# Patient Record
Sex: FEMALE | Race: WHITE | Employment: FULL TIME | URBAN - METROPOLITAN AREA
[De-identification: names, ages, dates, MRNs, and addresses within clinical notes are randomized per-mention and may not be internally consistent; named-entity substitution may affect disease eponyms.]

---

## 2024-02-06 NOTE — PROGRESS NOTES
[facility]  [address] [place] [address]  Phone: [phone]  Fax: [phone]      Date of Visit:  24  Patient Name: Katie Vincent   Patient :  2000     HPI:     Katie Vincent is a 23 y.o. female with   There is no problem list on file for this patient.    who presents today to discuss   Chief Complaint   Patient presents with    New Patient     Patient has neck pain which leads to her migraine. Patient does not have a migraine today, but had one yesterday. Patient states Excedrin everyday. KP       Recent labwork:  Hospital Outpatient Visit on 2024   Component Date Value Ref Range Status    Hemoglobin A1C 2024 4.9  4.0 - 6.0 % Final    Estimated Avg Glucose 2024 94  mg/dL Final    Comment: The ADA and AACC recommend providing the estimated average glucose result to permit better   patient understanding of their HBA1c result.      TSH 2024 1.58  0.27 - 4.20 uIU/mL Final    Sodium 2024 139  136 - 145 mmol/L Final    Potassium 2024 4.6  3.7 - 5.3 mmol/L Final    Chloride 2024 106  98 - 107 mmol/L Final    CO2 2024 24  20 - 31 mmol/L Final    Anion Gap 2024 9  9 - 16 mmol/L Final    Glucose 2024 91  74 - 99 mg/dL Final    BUN 2024 9  6 - 20 mg/dL Final    Creatinine 2024 0.8  0.50 - 0.90 mg/dL Final    Est, Glom Filt Rate 2024 >60  >60 mL/min/1.73m2 Final    Comment:       These results are not intended for use in patients <18 years of age.        eGFR results are calculated without a race factor using the  CKD-EPI equation.  Careful clinical correlation is recommended, particularly when comparing to results   calculated using previous equations.  The CKD-EPI equation is less accurate in patients with extremes of muscle mass, extra-renal   metabolism of creatine, excessive creatine ingestion, or following therapy that affects   renal

## 2024-02-07 ENCOUNTER — OFFICE VISIT (OUTPATIENT)
Age: 24
End: 2024-02-07
Payer: COMMERCIAL

## 2024-02-07 ENCOUNTER — HOSPITAL ENCOUNTER (OUTPATIENT)
Age: 24
Setting detail: SPECIMEN
Discharge: HOME | End: 2024-02-07

## 2024-02-07 VITALS
HEIGHT: 61 IN | TEMPERATURE: 98 F | WEIGHT: 154.6 LBS | HEART RATE: 84 BPM | SYSTOLIC BLOOD PRESSURE: 123 MMHG | RESPIRATION RATE: 16 BRPM | BODY MASS INDEX: 29.19 KG/M2 | DIASTOLIC BLOOD PRESSURE: 79 MMHG

## 2024-02-07 DIAGNOSIS — R03.0 ELEVATED BP WITHOUT DIAGNOSIS OF HYPERTENSION: ICD-10-CM

## 2024-02-07 DIAGNOSIS — Z86.32 HISTORY OF GESTATIONAL DIABETES: ICD-10-CM

## 2024-02-07 DIAGNOSIS — J30.89 NON-SEASONAL ALLERGIC RHINITIS, UNSPECIFIED TRIGGER: ICD-10-CM

## 2024-02-07 DIAGNOSIS — G44.209 ACUTE NON INTRACTABLE TENSION-TYPE HEADACHE: Primary | ICD-10-CM

## 2024-02-07 DIAGNOSIS — G44.209 ACUTE NON INTRACTABLE TENSION-TYPE HEADACHE: ICD-10-CM

## 2024-02-07 LAB
ALBUMIN SERPL-MCNC: 4.5 G/DL (ref 3.5–5.2)
ALBUMIN/GLOB SERPL: 2 {RATIO} (ref 1–2.5)
ALP SERPL-CCNC: 75 U/L (ref 35–104)
ALT SERPL-CCNC: 12 U/L (ref 10–35)
ANION GAP SERPL CALCULATED.3IONS-SCNC: 9 MMOL/L (ref 9–16)
AST SERPL-CCNC: 18 U/L (ref 10–35)
BASOPHILS # BLD: 0.04 K/UL (ref 0–0.2)
BASOPHILS NFR BLD: 1 % (ref 0–2)
BILIRUB SERPL-MCNC: 0.2 MG/DL (ref 0–1.2)
BUN SERPL-MCNC: 9 MG/DL (ref 6–20)
CALCIUM SERPL-MCNC: 9.1 MG/DL (ref 8.6–10.4)
CHLORIDE SERPL-SCNC: 106 MMOL/L (ref 98–107)
CO2 SERPL-SCNC: 24 MMOL/L (ref 20–31)
CREAT SERPL-MCNC: 0.8 MG/DL (ref 0.5–0.9)
EOSINOPHIL # BLD: 0.09 K/UL (ref 0–0.44)
EOSINOPHILS RELATIVE PERCENT: 2 % (ref 1–4)
ERYTHROCYTE [DISTWIDTH] IN BLOOD BY AUTOMATED COUNT: 13.3 % (ref 11.8–14.4)
EST. AVERAGE GLUCOSE BLD GHB EST-MCNC: 94 MG/DL
GFR SERPL CREATININE-BSD FRML MDRD: >60 ML/MIN/1.73M2
GLUCOSE SERPL-MCNC: 91 MG/DL (ref 74–99)
HBA1C MFR BLD: 4.9 % (ref 4–6)
HCT VFR BLD AUTO: 40.4 % (ref 36.3–47.1)
HGB BLD-MCNC: 13.4 G/DL (ref 11.9–15.1)
IMM GRANULOCYTES # BLD AUTO: <0.03 K/UL (ref 0–0.3)
IMM GRANULOCYTES NFR BLD: 0 %
LYMPHOCYTES NFR BLD: 1.31 K/UL (ref 1.1–3.7)
LYMPHOCYTES RELATIVE PERCENT: 27 % (ref 24–43)
MCH RBC QN AUTO: 29.3 PG (ref 25.2–33.5)
MCHC RBC AUTO-ENTMCNC: 33.2 G/DL (ref 28.4–34.8)
MCV RBC AUTO: 88.2 FL (ref 82.6–102.9)
MONOCYTES NFR BLD: 0.37 K/UL (ref 0.1–1.2)
MONOCYTES NFR BLD: 8 % (ref 3–12)
NEUTROPHILS NFR BLD: 62 % (ref 36–65)
NEUTS SEG NFR BLD: 3.01 K/UL (ref 1.5–8.1)
NRBC BLD-RTO: 0 PER 100 WBC
PLATELET # BLD AUTO: 196 K/UL (ref 138–453)
PMV BLD AUTO: 12.5 FL (ref 8.1–13.5)
POTASSIUM SERPL-SCNC: 4.6 MMOL/L (ref 3.7–5.3)
PROT SERPL-MCNC: 7 G/DL (ref 6.6–8.7)
RBC # BLD AUTO: 4.58 M/UL (ref 3.95–5.11)
SODIUM SERPL-SCNC: 139 MMOL/L (ref 136–145)
TSH SERPL DL<=0.05 MIU/L-ACNC: 1.58 UIU/ML (ref 0.27–4.2)
WBC OTHER # BLD: 4.8 K/UL (ref 3.5–11.3)

## 2024-02-07 PROCEDURE — G2211 COMPLEX E/M VISIT ADD ON: HCPCS | Performed by: FAMILY MEDICINE

## 2024-02-07 PROCEDURE — 99212 OFFICE O/P EST SF 10 MIN: CPT | Performed by: FAMILY MEDICINE

## 2024-02-07 PROCEDURE — 99204 OFFICE O/P NEW MOD 45 MIN: CPT | Performed by: FAMILY MEDICINE

## 2024-02-07 RX ORDER — METOPROLOL SUCCINATE 25 MG/1
25 TABLET, EXTENDED RELEASE ORAL DAILY
Qty: 90 TABLET | Refills: 1 | Status: SHIPPED | OUTPATIENT
Start: 2024-02-07

## 2024-02-07 RX ORDER — FLUTICASONE PROPIONATE 50 MCG
2 SPRAY, SUSPENSION (ML) NASAL DAILY
Qty: 16 G | Refills: 0 | Status: SHIPPED | OUTPATIENT
Start: 2024-02-07

## 2024-02-07 RX ORDER — CYCLOBENZAPRINE HCL 5 MG
5 TABLET ORAL NIGHTLY PRN
Qty: 30 TABLET | Refills: 1 | Status: SHIPPED | OUTPATIENT
Start: 2024-02-07

## 2024-02-07 RX ORDER — FEXOFENADINE HCL 180 MG/1
180 TABLET ORAL DAILY
Qty: 30 TABLET | Refills: 0 | Status: SHIPPED | OUTPATIENT
Start: 2024-02-07 | End: 2024-03-08

## 2024-02-07 NOTE — PATIENT INSTRUCTIONS
There are number of different medications we can use for your headaches.  I am when to try to tailor it so that you only use medication for rescue.  I think that the frequency your headaches are enough with daily that starting on something prophylactic would be a good idea.  I am going to suggest that we put you on the Tapazole which is a beta-blocker that she take once a day may also help with your blood pressure.    I am also going to place you on Allegra 180 mg once a day for your allergies in addition to Flonase which is a steroid nasal spray I think if we can open up your nasal passages and reduce the inflammation in your face that may help your headaches.  Would also like to test there is some blood work looking for anemia and iron deficiency.  In addition baseline liver and kidney function as part of the workup for your hypertension.    Increased attention to your hands and neck are as well as sleep position may also be helpful.  Believe it or not a short flat pillow has been found to be the best in left your side sleeper than actually the kidney bean shape is about the best.    I want you to start a diary of when you get your headache the severity of the headache and what you think may be attributed.  Bring that with you at your next visit in about 6 weeks.  It will take about 2 to 3 weeks to see some of the benefit for what we are putting you on.  I would also like you to start monitoring her blood pressure because I think some of this may be related to blood pressure.    On days that you are have the most tension in head neck I want to use the Flexeril 5 mg at bedtime.

## 2024-05-09 ENCOUNTER — OFFICE VISIT (OUTPATIENT)
Age: 24
End: 2024-05-09
Payer: COMMERCIAL

## 2024-05-09 VITALS
BODY MASS INDEX: 29.4 KG/M2 | DIASTOLIC BLOOD PRESSURE: 69 MMHG | HEIGHT: 62 IN | TEMPERATURE: 97.9 F | SYSTOLIC BLOOD PRESSURE: 122 MMHG | HEART RATE: 64 BPM | WEIGHT: 159.8 LBS | RESPIRATION RATE: 16 BRPM

## 2024-05-09 DIAGNOSIS — F42.2 MIXED OBSESSIONAL THOUGHTS AND ACTS: Primary | ICD-10-CM

## 2024-05-09 DIAGNOSIS — J30.89 NON-SEASONAL ALLERGIC RHINITIS, UNSPECIFIED TRIGGER: ICD-10-CM

## 2024-05-09 DIAGNOSIS — J30.1 NON-SEASONAL ALLERGIC RHINITIS DUE TO POLLEN: ICD-10-CM

## 2024-05-09 DIAGNOSIS — G44.229 CHRONIC TENSION-TYPE HEADACHE, NOT INTRACTABLE: ICD-10-CM

## 2024-05-09 DIAGNOSIS — R03.0 ELEVATED BP WITHOUT DIAGNOSIS OF HYPERTENSION: ICD-10-CM

## 2024-05-09 PROCEDURE — 99214 OFFICE O/P EST MOD 30 MIN: CPT | Performed by: FAMILY MEDICINE

## 2024-05-09 PROCEDURE — 99211 OFF/OP EST MAY X REQ PHY/QHP: CPT

## 2024-05-09 RX ORDER — ESCITALOPRAM OXALATE 10 MG/1
10 TABLET ORAL DAILY
Qty: 30 TABLET | Refills: 5 | Status: SHIPPED | OUTPATIENT
Start: 2024-05-09

## 2024-05-09 RX ORDER — LORATADINE 10 MG/1
10 TABLET ORAL DAILY
Qty: 30 TABLET | Refills: 0 | COMMUNITY
Start: 2024-05-09 | End: 2024-06-08

## 2024-05-09 ASSESSMENT — ENCOUNTER SYMPTOMS
EYE ITCHING: 0
SHORTNESS OF BREATH: 0
COUGH: 0
CONSTIPATION: 0
APNEA: 0
DIARRHEA: 0
EYE REDNESS: 0
ABDOMINAL PAIN: 0
SORE THROAT: 0
CHEST TIGHTNESS: 0
RHINORRHEA: 1
PHOTOPHOBIA: 0

## 2024-05-09 NOTE — ASSESSMENT & PLAN NOTE
Stressed importance of cognitive behavioral therapy and reaching out with her counselor.  Have also started her back on the Lexapro 10 mg once a day.  She might want to start with half a tablet for the first week or so and then increase to full tablet.

## 2024-05-09 NOTE — PROGRESS NOTES
[facility]  [address] [place] [address]  Phone: [phone]  Fax: [phone]      Date of Visit:  24   Patient Name: Katie Vincent   Patient :  2000     ASSESSMENT/PLAN     1. Mixed obsessional thoughts and acts  Assessment & Plan:  Stressed importance of cognitive behavioral therapy and reaching out with her counselor.  Have also started her back on the Lexapro 10 mg once a day.  She might want to start with half a tablet for the first week or so and then increase to full tablet.    Orders:  -     escitalopram (LEXAPRO) 10 MG tablet; Take 1 tablet by mouth daily, Disp-30 tablet, R-5Normal  2. Non-seasonal allergic rhinitis, unspecified trigger  Assessment & Plan:  Suggest getting on the Claritin and taking it along with the Flonase until she can breathe through both nostrils without being heard from across the room.  She can back off the Flonase but continue the Claritin because I believe she has seasonal allergies.    3. Elevated BP without diagnosis of hypertension  Assessment & Plan:  Will continue to follow.  For now suggest resuming the metoprolol that she had been on.  Believe by reducing her generalized process we may be improving blood pressure.    4. Non-seasonal allergic rhinitis due to pollen  Assessment & Plan:  Suggest getting on the Claritin and taking it along with the Flonase until she can breathe through both nostrils without being heard from across the room.  She can back off the Flonase but continue the Claritin because I believe she has seasonal allergies.    5. Chronic tension-type headache, not intractable  Assessment & Plan:  Importance of addressing her allergies which add to her tension type headache in terms of inflammation in addition to addressing what is causing the tension i.e. uncontrolled untreated OCD.  Would like her to stop the Excedrin Migraine.  She does not need the additional

## 2024-05-09 NOTE — ASSESSMENT & PLAN NOTE
Will continue to follow.  For now suggest resuming the metoprolol that she had been on.  Believe by reducing her generalized process we may be improving blood pressure.

## 2024-05-09 NOTE — ASSESSMENT & PLAN NOTE
Importance of addressing her allergies which add to her tension type headache in terms of inflammation in addition to addressing what is causing the tension i.e. uncontrolled untreated OCD.  Would like her to stop the Excedrin Migraine.  She does not need the additional caffeine which might be adding to her sleep issues.  It also results in rebound type headaches.  Naprosyn would be a better choice.  If the Naprosyn does not work then she can take the Fioricet which does have a little caffeine as well as acetaminophen in it.  It also has a muscle relaxant.  Very important she record her headaches and bring her headache diary in with her.  At her next visit

## 2024-05-09 NOTE — ASSESSMENT & PLAN NOTE
Suggest getting on the Claritin and taking it along with the Flonase until she can breathe through both nostrils without being heard from across the room.  She can back off the Flonase but continue the Claritin because I believe she has seasonal allergies.

## 2024-05-09 NOTE — PATIENT INSTRUCTIONS
Will start you back on the Lexapro 10 mg once a day.  Try to take steps to make sure that you when you open up the top of the pill bottle you can identify an account for any spill that might happen and that should help lessen your concern of unintended ingestion of the Lexapro by her son.    Lexapro is extremely safe and we use it in pregnancy even if he took several it probably would not hurt him.    Need to get back on the allergy medication Claritin 10 mg once a day is exceptionally safe and is cheap the Flonase the worst thing you can do is causing nosebleed in which case stop if we are seeing that much of the shiners I know that that is setting you up for another headache.    The metoprolol will not lower your blood pressure too far it will however markedly reduce the chances of your tension headache going back into a full migraine.    Your blood work overall was excellent!  Your A1c was 4.7.  Your elevated blood sugars in the morning fasting may be attributable to underlying glucose intolerance and/or the stress that you face on a daily basis.    I want you to reconnect with your counselor.  Cognitive behavioral therapy or coaching is the single most important thing for obsessive-compulsive disorder.  It will affect most aspects of your life.  Places tremendous stress on people.

## 2024-05-10 DIAGNOSIS — R51.9 NONINTRACTABLE HEADACHE, UNSPECIFIED CHRONICITY PATTERN, UNSPECIFIED HEADACHE TYPE: ICD-10-CM

## 2024-05-10 RX ORDER — BUTALBITAL, ACETAMINOPHEN AND CAFFEINE 50; 325; 40 MG/1; MG/1; MG/1
1 TABLET ORAL EVERY 4 HOURS PRN
Qty: 30 TABLET | Refills: 1 | Status: SHIPPED | OUTPATIENT
Start: 2024-05-10

## 2024-07-25 ENCOUNTER — OFFICE VISIT (OUTPATIENT)
Age: 24
End: 2024-07-25
Payer: COMMERCIAL

## 2024-07-25 VITALS
WEIGHT: 158 LBS | SYSTOLIC BLOOD PRESSURE: 112 MMHG | RESPIRATION RATE: 14 BRPM | TEMPERATURE: 98.7 F | DIASTOLIC BLOOD PRESSURE: 66 MMHG | HEIGHT: 62 IN | BODY MASS INDEX: 29.08 KG/M2 | HEART RATE: 77 BPM

## 2024-07-25 DIAGNOSIS — G44.209 TENSION HEADACHE: ICD-10-CM

## 2024-07-25 DIAGNOSIS — F42.2 MIXED OBSESSIONAL THOUGHTS AND ACTS: ICD-10-CM

## 2024-07-25 DIAGNOSIS — G43.109 MIGRAINE WITH AURA AND WITHOUT STATUS MIGRAINOSUS, NOT INTRACTABLE: Primary | ICD-10-CM

## 2024-07-25 DIAGNOSIS — J30.89 NON-SEASONAL ALLERGIC RHINITIS, UNSPECIFIED TRIGGER: ICD-10-CM

## 2024-07-25 PROCEDURE — 99214 OFFICE O/P EST MOD 30 MIN: CPT | Performed by: FAMILY MEDICINE

## 2024-07-25 PROCEDURE — 99212 OFFICE O/P EST SF 10 MIN: CPT | Performed by: FAMILY MEDICINE

## 2024-07-25 RX ORDER — FLUTICASONE PROPIONATE 50 MCG
2 SPRAY, SUSPENSION (ML) NASAL DAILY
Qty: 16 G | Refills: 0 | Status: SHIPPED | OUTPATIENT
Start: 2024-07-25

## 2024-07-25 RX ORDER — METOPROLOL SUCCINATE 25 MG/1
25 TABLET, EXTENDED RELEASE ORAL DAILY
COMMUNITY

## 2024-07-25 RX ORDER — ESCITALOPRAM OXALATE 20 MG/1
20 TABLET ORAL DAILY
Qty: 90 TABLET | Refills: 1 | Status: SHIPPED | OUTPATIENT
Start: 2024-07-25

## 2024-07-25 NOTE — PATIENT INSTRUCTIONS
For the symptoms of OCD you are on a very starting dose of Lexapro and I would go up from 10 to 20 mg/day which is the more of the working dose of Lexapro.  If we were just addressing migraines or depression I would suggest switching from Lexapro to Wellbutrin but Lexapro does not have an indication for OCD.    You meet the criteria barely for the use of the Nurtec for prevention or suppression of migraine 75 mg tablet every other day will write for 15 with 2 refills.  If it proves to be too expensive then I would consider the use of baclofen 10 to 20 mg at bedtime just because of the goodly portion of your headaches are related to muscle tension across the back of the head.    The headaches that across the forehead I typically sound either is frontal sinus or tension and that is where the baclofen actually would work better.  You also want to consider if we are doing all we can do with regards to your nasal allergies and nasal obstruction.  Then a nonsedating antihistamine in addition to the nasal steroid and/or regular use of a nasal saline to help flush those allergens off the surface before they trigger the release of the histamine is also indicated histamine is also related to the triggering migraines.

## 2024-07-25 NOTE — PROGRESS NOTES
[facility]  [address] [place] [address]  Phone: [phone]  Fax: [phone]      Date of Visit:  24  Patient Name: Katie Vincent   Patient :  2000     ASSESSMENT/PLAN     1. Intractable migraine with aura without status migrainosus  2. Non-seasonal allergic rhinitis, unspecified trigger  -     fluticasone (FLONASE) 50 MCG/ACT nasal spray; 2 sprays by Each Nostril route daily, Disp-16 g, R-0Normal  3. Mixed obsessional thoughts and acts  -     escitalopram (LEXAPRO) 20 MG tablet; Take 1 tablet by mouth daily, Disp-90 tablet, R-1Normal  4. Tension headache     For the symptoms of OCD you are on a very starting dose of Lexapro and I would go up from 10 to 20 mg/day which is the more of the working dose of Lexapro.  If we were just addressing migraines or depression I would suggest switching from Lexapro to Wellbutrin but Lexapro does not have an indication for OCD.    You meet the criteria barely for the use of the Nurtec for prevention or suppression of migraine 75 mg tablet every other day will write for 15 with 2 refills.  If it proves to be too expensive then I would consider the use of baclofen 10 to 20 mg at bedtime just because of the goodly portion of your headaches are related to muscle tension across the back of the head.      Return in about 3 months (around 10/25/2024) for Chronic Disease FU headache migraine intense.    - Questions/concerns answered. Patient verbalized and expressed understanding. Medications, laboratory testing, imaging, consultation, and follow up as documented in this encounter.       Please be aware portions of this note were completed using voice recognition software and unforeseen errors may have occurred    I personally reviewed the patient's past medical history, current medications, allergies, surgical history, family history and social history.  Updates were made as

## 2024-07-29 PROBLEM — G43.109 MIGRAINE WITH AURA AND WITHOUT STATUS MIGRAINOSUS, NOT INTRACTABLE: Status: ACTIVE | Noted: 2024-07-29

## 2024-07-29 ASSESSMENT — ENCOUNTER SYMPTOMS
SHORTNESS OF BREATH: 0
CONSTIPATION: 0
APNEA: 0
EYE REDNESS: 0
DIARRHEA: 0
EYE ITCHING: 0
CHEST TIGHTNESS: 0
ABDOMINAL PAIN: 0
SORE THROAT: 0
RHINORRHEA: 0
COUGH: 0
PHOTOPHOBIA: 0

## 2024-12-05 ENCOUNTER — PATIENT MESSAGE (OUTPATIENT)
Age: 24
End: 2024-12-05

## 2024-12-05 DIAGNOSIS — J30.89 NON-SEASONAL ALLERGIC RHINITIS, UNSPECIFIED TRIGGER: ICD-10-CM

## 2024-12-06 RX ORDER — METOPROLOL SUCCINATE 25 MG/1
25 TABLET, EXTENDED RELEASE ORAL DAILY
Qty: 30 TABLET | Refills: 5 | Status: SHIPPED | OUTPATIENT
Start: 2024-12-06

## 2024-12-06 RX ORDER — CYCLOBENZAPRINE HCL 5 MG
5 TABLET ORAL 3 TIMES DAILY PRN
Qty: 60 TABLET | Refills: 1 | Status: SHIPPED | OUTPATIENT
Start: 2024-12-06

## 2024-12-06 RX ORDER — FLUTICASONE PROPIONATE 50 MCG
2 SPRAY, SUSPENSION (ML) NASAL DAILY
Qty: 16 G | Refills: 0 | Status: SHIPPED | OUTPATIENT
Start: 2024-12-06

## 2024-12-10 ENCOUNTER — HOSPITAL ENCOUNTER (EMERGENCY)
Age: 24
Discharge: HOME | End: 2024-12-10
Attending: STUDENT IN AN ORGANIZED HEALTH CARE EDUCATION/TRAINING PROGRAM
Payer: COMMERCIAL

## 2024-12-10 VITALS
HEART RATE: 91 BPM | SYSTOLIC BLOOD PRESSURE: 123 MMHG | RESPIRATION RATE: 18 BRPM | OXYGEN SATURATION: 97 % | WEIGHT: 170 LBS | DIASTOLIC BLOOD PRESSURE: 88 MMHG | HEIGHT: 60 IN | BODY MASS INDEX: 33.38 KG/M2 | TEMPERATURE: 98.2 F

## 2024-12-10 DIAGNOSIS — R11.2 NAUSEA AND VOMITING, UNSPECIFIED VOMITING TYPE: ICD-10-CM

## 2024-12-10 DIAGNOSIS — B34.9 VIRAL ILLNESS: Primary | ICD-10-CM

## 2024-12-10 LAB
FLUAV RNA RESP QL NAA+PROBE: NOT DETECTED
FLUBV RNA RESP QL NAA+PROBE: NOT DETECTED
SARS-COV-2 RNA RESP QL NAA+PROBE: NOT DETECTED
SOURCE: NORMAL
SPECIMEN DESCRIPTION: NORMAL

## 2024-12-10 PROCEDURE — 87636 SARSCOV2 & INF A&B AMP PRB: CPT

## 2024-12-10 PROCEDURE — 99283 EMERGENCY DEPT VISIT LOW MDM: CPT

## 2024-12-10 PROCEDURE — 6370000000 HC RX 637 (ALT 250 FOR IP)

## 2024-12-10 RX ORDER — ONDANSETRON 4 MG/1
4 TABLET, ORALLY DISINTEGRATING ORAL 3 TIMES DAILY PRN
Qty: 21 TABLET | Refills: 0 | Status: SHIPPED | OUTPATIENT
Start: 2024-12-10

## 2024-12-10 RX ORDER — ONDANSETRON 4 MG/1
4 TABLET, ORALLY DISINTEGRATING ORAL ONCE
Status: COMPLETED | OUTPATIENT
Start: 2024-12-10 | End: 2024-12-10

## 2024-12-10 RX ADMIN — ONDANSETRON 4 MG: 4 TABLET, ORALLY DISINTEGRATING ORAL at 20:55

## 2024-12-10 ASSESSMENT — LIFESTYLE VARIABLES
HOW MANY STANDARD DRINKS CONTAINING ALCOHOL DO YOU HAVE ON A TYPICAL DAY: PATIENT DOES NOT DRINK
HOW OFTEN DO YOU HAVE A DRINK CONTAINING ALCOHOL: NEVER

## 2024-12-11 NOTE — DISCHARGE INSTRUCTIONS
COVID and flu negative.  Recommend staying well-hydrated and drinking plenty of fluids.  A prescription for antinausea medicine has been prescribed, please take as needed.    Return to the Emergency Department immediately for worsening of your symptoms.

## 2024-12-11 NOTE — ED PROVIDER NOTES
EMERGENCY DEPARTMENT ENCOUNTER   ATTENDING ATTESTATION     Pt Name: Katie Vincent  MRN: [id number]  Birthdate 2000  Date of evaluation: 12/10/24       Katie Vincent is a 24 y.o. female who presents with Abdominal Pain (Diarrhea, nausea, abd pain that started today. ) and Dizziness (Pt c/o some intermittent dizziness throughout the day. Pt states mostly when she stands up, she feels dizzy. Pt also c/o feeling her heart racing, but states that is usually normal for her the day after she smokes weed, and she smoked weed last night. )    Diarrhea and vomiting    No pain    Normal vital signs well appearing    Will swab for covid/flu    MDM:     Suspect viral illness    Discharge with symptomatic therapy    Vitals:   Vitals:    12/10/24 1950 12/10/24 1952   BP:  (!) 138/97   Pulse:  91   Resp:  18   Temp:  98.2 °F (36.8 °C)   TempSrc:  Oral   SpO2:  99%   Weight: 77.1 kg (170 lb)    Height: 1.524 m (5')          I personally saw and examined the patient. I have reviewed and agree with the resident's findings, including all diagnostic interpretations and treatment plan as written. I was present for the key portions of any procedures performed and the inclusive time noted for any critical care statement.    Berry Sanders MD  Attending Emergency Physician            Berry Sanders MD  12/10/24 2044    
REFERRED TO:  [facility]  [address] [address]  [phone]  Go to   If symptoms worsen      DISCHARGE MEDICATIONS:  Current Discharge Medication List        START taking these medications    Details   ondansetron (ZOFRAN-ODT) 4 MG disintegrating tablet Take 1 tablet by mouth 3 times daily as needed for Nausea or Vomiting  Qty: 21 tablet, Refills: 0             Mart Stover MD  Emergency Medicine Resident    (Please note that portions of thisnote were completed with a voice recognition program.  Efforts were made to edit the dictations but occasionally words are mis-transcribed.)

## 2025-03-20 ENCOUNTER — APPOINTMENT (OUTPATIENT)
Dept: CT IMAGING | Age: 25
End: 2025-03-20
Payer: COMMERCIAL

## 2025-03-20 ENCOUNTER — HOSPITAL ENCOUNTER (EMERGENCY)
Age: 25
Discharge: HOME | End: 2025-03-20
Attending: EMERGENCY MEDICINE
Payer: COMMERCIAL

## 2025-03-20 VITALS
HEIGHT: 60 IN | TEMPERATURE: 98.2 F | RESPIRATION RATE: 16 BRPM | WEIGHT: 160 LBS | BODY MASS INDEX: 31.41 KG/M2 | OXYGEN SATURATION: 100 % | SYSTOLIC BLOOD PRESSURE: 138 MMHG | DIASTOLIC BLOOD PRESSURE: 90 MMHG | HEART RATE: 79 BPM

## 2025-03-20 DIAGNOSIS — H53.8 BLURRED VISION, LEFT EYE: Primary | ICD-10-CM

## 2025-03-20 LAB
ANION GAP SERPL CALCULATED.3IONS-SCNC: 9 MMOL/L (ref 9–16)
BASOPHILS # BLD: 0 K/UL (ref 0–0.2)
BASOPHILS NFR BLD: 1 % (ref 0–2)
BUN SERPL-MCNC: 12 MG/DL (ref 6–20)
CALCIUM SERPL-MCNC: 8.9 MG/DL (ref 8.6–10.4)
CHLORIDE SERPL-SCNC: 106 MMOL/L (ref 98–107)
CO2 SERPL-SCNC: 25 MMOL/L (ref 20–31)
CREAT SERPL-MCNC: 0.8 MG/DL (ref 0.7–1.2)
CRP SERPL HS-MCNC: 4.3 MG/L (ref 0–5)
EOSINOPHIL # BLD: 0.1 K/UL (ref 0–0.4)
EOSINOPHILS RELATIVE PERCENT: 1 % (ref 0–4)
ERYTHROCYTE [DISTWIDTH] IN BLOOD BY AUTOMATED COUNT: 14.2 % (ref 11.5–14.9)
ERYTHROCYTE [SEDIMENTATION RATE] IN BLOOD BY PHOTOMETRIC METHOD: 5 MM/HR (ref 0–20)
GFR, ESTIMATED: >90 ML/MIN/1.73M2
GLUCOSE SERPL-MCNC: 94 MG/DL (ref 74–99)
HCG SERPL QL: NEGATIVE
HCT VFR BLD AUTO: 40.7 % (ref 36–46)
HGB BLD-MCNC: 14 G/DL (ref 12–16)
INR PPP: 1
LYMPHOCYTES NFR BLD: 1.5 K/UL (ref 1–4.8)
LYMPHOCYTES RELATIVE PERCENT: 22 % (ref 24–44)
MCH RBC QN AUTO: 29.7 PG (ref 26–34)
MCHC RBC AUTO-ENTMCNC: 34.4 G/DL (ref 31–37)
MCV RBC AUTO: 86.4 FL (ref 80–100)
MONOCYTES NFR BLD: 0.5 K/UL (ref 0.1–1.3)
MONOCYTES NFR BLD: 7 % (ref 1–7)
NEUTROPHILS NFR BLD: 69 % (ref 36–66)
NEUTS SEG NFR BLD: 4.5 K/UL (ref 1.3–9.1)
PLATELET # BLD AUTO: 190 K/UL (ref 150–450)
PMV BLD AUTO: 9.5 FL (ref 6–12)
POTASSIUM SERPL-SCNC: 4 MMOL/L (ref 3.7–5.3)
PROTHROMBIN TIME: 13.9 SEC (ref 11.8–14.6)
RBC # BLD AUTO: 4.71 M/UL (ref 4–5.2)
SODIUM SERPL-SCNC: 140 MMOL/L (ref 136–145)
WBC OTHER # BLD: 6.6 K/UL (ref 3.5–11)

## 2025-03-20 PROCEDURE — 2500000003 HC RX 250 WO HCPCS: Performed by: PHYSICIAN ASSISTANT

## 2025-03-20 PROCEDURE — 85025 COMPLETE CBC W/AUTO DIFF WBC: CPT

## 2025-03-20 PROCEDURE — 96374 THER/PROPH/DIAG INJ IV PUSH: CPT

## 2025-03-20 PROCEDURE — 85652 RBC SED RATE AUTOMATED: CPT

## 2025-03-20 PROCEDURE — 86140 C-REACTIVE PROTEIN: CPT

## 2025-03-20 PROCEDURE — 6360000002 HC RX W HCPCS: Performed by: PHYSICIAN ASSISTANT

## 2025-03-20 PROCEDURE — 6360000004 HC RX CONTRAST MEDICATION: Performed by: PHYSICIAN ASSISTANT

## 2025-03-20 PROCEDURE — 80048 BASIC METABOLIC PNL TOTAL CA: CPT

## 2025-03-20 PROCEDURE — 96375 TX/PRO/DX INJ NEW DRUG ADDON: CPT

## 2025-03-20 PROCEDURE — 99285 EMERGENCY DEPT VISIT HI MDM: CPT

## 2025-03-20 PROCEDURE — 2580000003 HC RX 258: Performed by: PHYSICIAN ASSISTANT

## 2025-03-20 PROCEDURE — 36415 COLL VENOUS BLD VENIPUNCTURE: CPT

## 2025-03-20 PROCEDURE — 84703 CHORIONIC GONADOTROPIN ASSAY: CPT

## 2025-03-20 PROCEDURE — 70450 CT HEAD/BRAIN W/O DYE: CPT

## 2025-03-20 PROCEDURE — 6370000000 HC RX 637 (ALT 250 FOR IP): Performed by: PHYSICIAN ASSISTANT

## 2025-03-20 PROCEDURE — 85610 PROTHROMBIN TIME: CPT

## 2025-03-20 PROCEDURE — 70496 CT ANGIOGRAPHY HEAD: CPT

## 2025-03-20 PROCEDURE — 93005 ELECTROCARDIOGRAM TRACING: CPT | Performed by: PHYSICIAN ASSISTANT

## 2025-03-20 RX ORDER — DIPHENHYDRAMINE HYDROCHLORIDE 50 MG/ML
25 INJECTION, SOLUTION INTRAMUSCULAR; INTRAVENOUS ONCE
Status: COMPLETED | OUTPATIENT
Start: 2025-03-20 | End: 2025-03-20

## 2025-03-20 RX ORDER — METOCLOPRAMIDE HYDROCHLORIDE 5 MG/ML
5 INJECTION INTRAMUSCULAR; INTRAVENOUS ONCE
Status: COMPLETED | OUTPATIENT
Start: 2025-03-20 | End: 2025-03-20

## 2025-03-20 RX ORDER — ONDANSETRON 2 MG/ML
4 INJECTION INTRAMUSCULAR; INTRAVENOUS ONCE
Status: COMPLETED | OUTPATIENT
Start: 2025-03-20 | End: 2025-03-20

## 2025-03-20 RX ORDER — SODIUM CHLORIDE 0.9 % (FLUSH) 0.9 %
10 SYRINGE (ML) INJECTION PRN
Status: DISCONTINUED | OUTPATIENT
Start: 2025-03-20 | End: 2025-03-20 | Stop reason: HOSPADM

## 2025-03-20 RX ORDER — TETRACAINE HYDROCHLORIDE 5 MG/ML
1 SOLUTION OPHTHALMIC ONCE
Status: COMPLETED | OUTPATIENT
Start: 2025-03-20 | End: 2025-03-20

## 2025-03-20 RX ORDER — 0.9 % SODIUM CHLORIDE 0.9 %
100 INTRAVENOUS SOLUTION INTRAVENOUS ONCE
Status: COMPLETED | OUTPATIENT
Start: 2025-03-20 | End: 2025-03-20

## 2025-03-20 RX ORDER — KETOROLAC TROMETHAMINE 30 MG/ML
30 INJECTION, SOLUTION INTRAMUSCULAR; INTRAVENOUS ONCE
Status: COMPLETED | OUTPATIENT
Start: 2025-03-20 | End: 2025-03-20

## 2025-03-20 RX ORDER — 0.9 % SODIUM CHLORIDE 0.9 %
1000 INTRAVENOUS SOLUTION INTRAVENOUS ONCE
Status: COMPLETED | OUTPATIENT
Start: 2025-03-20 | End: 2025-03-20

## 2025-03-20 RX ORDER — IOPAMIDOL 755 MG/ML
75 INJECTION, SOLUTION INTRAVASCULAR
Status: COMPLETED | OUTPATIENT
Start: 2025-03-20 | End: 2025-03-20

## 2025-03-20 RX ADMIN — SODIUM CHLORIDE 100 ML: 9 INJECTION, SOLUTION INTRAVENOUS at 18:12

## 2025-03-20 RX ADMIN — SODIUM CHLORIDE, PRESERVATIVE FREE 10 ML: 5 INJECTION INTRAVENOUS at 18:11

## 2025-03-20 RX ADMIN — TETRACAINE HYDROCHLORIDE 1 DROP: 5 SOLUTION OPHTHALMIC at 15:43

## 2025-03-20 RX ADMIN — FLUORESCEIN SODIUM 1 MG: 1 STRIP OPHTHALMIC at 15:44

## 2025-03-20 RX ADMIN — KETOROLAC TROMETHAMINE 30 MG: 30 INJECTION, SOLUTION INTRAMUSCULAR at 16:26

## 2025-03-20 RX ADMIN — SODIUM CHLORIDE 1000 ML: 0.9 INJECTION, SOLUTION INTRAVENOUS at 16:25

## 2025-03-20 RX ADMIN — ONDANSETRON 4 MG: 2 INJECTION, SOLUTION INTRAMUSCULAR; INTRAVENOUS at 16:27

## 2025-03-20 RX ADMIN — IOPAMIDOL 75 ML: 755 INJECTION, SOLUTION INTRAVENOUS at 18:11

## 2025-03-20 RX ADMIN — METOCLOPRAMIDE 5 MG: 5 INJECTION, SOLUTION INTRAMUSCULAR; INTRAVENOUS at 16:28

## 2025-03-20 RX ADMIN — DIPHENHYDRAMINE HYDROCHLORIDE 25 MG: 50 INJECTION INTRAMUSCULAR; INTRAVENOUS at 16:28

## 2025-03-20 ASSESSMENT — ENCOUNTER SYMPTOMS
EYE DISCHARGE: 0
EYE INFLAMMATION: 0
NAUSEA: 0
EYE REDNESS: 0
EYE PAIN: 0
BLIND SPOTS: 0
DOUBLE VISION: 0
EYE WATERING: 0
EYE ITCHING: 0
BLURRED VISION: 1
VOMITING: 0
PERI-ORBITAL EDEMA: 0
PHOTOPHOBIA: 0

## 2025-03-20 ASSESSMENT — VISUAL ACUITY
OU: 1
OS: 20/50
OD: 20/30
OU: 20/40

## 2025-03-20 ASSESSMENT — TONOMETRY
OS_IOP_MMHG: 18
IOP_HANDHELD: 1

## 2025-03-20 ASSESSMENT — LIFESTYLE VARIABLES
HOW OFTEN DO YOU HAVE A DRINK CONTAINING ALCOHOL: NEVER
HOW MANY STANDARD DRINKS CONTAINING ALCOHOL DO YOU HAVE ON A TYPICAL DAY: PATIENT DOES NOT DRINK

## 2025-03-20 NOTE — ED PROVIDER NOTES
eMERGENCY dEPARTMENT  Attending Physician Attestation     Pt Name: Katie Vincent  MRN: [id number]  Birthdate 2000  Date of evaluation: 3/20/25     Katie Vincent is a 25 y.o. female with CC: Blurred Vision and Arm Pain      Based on the medical record the care appears appropriate.  I was personally available for consultation in the Emergency Department.    Kaden Tran DO  Attending Emergency Physician                 Kaden Tran DO  03/20/25 2100

## 2025-03-20 NOTE — ED TRIAGE NOTES
Mode of arrival (squad #, walk in, police, etc) : walk-in        Chief complaint(s): arm pain/blurred vision        Arrival Note (brief scenario, treatment PTA, etc).: pt reports blurry vision in left eye. Pt reports left arm pain and the urgent care sent her here for a ct to rule out a pinched nerve        C= \"Have you ever felt that you should Cut down on your drinking?\"  No  A= \"Have people Annoyed you by criticizing your drinking?\"  No  G= \"Have you ever felt bad or Guilty about your drinking?\"  No  E= \"Have you ever had a drink as an Eye-opener first thing in the morning to steady your nerves or to help a hangover?\"  No      Deferred []      Reason for deferring: N/A    *If yes to two or more: probable alcohol abuse.*

## 2025-03-20 NOTE — ED PROVIDER NOTES
[facility] EMERGENCY DEPARTMENT  eMERGENCY dEPARTMENT eNCOUnter      Pt Name: Katie Vincent  MRN: [id number]  Birthdate 2000  Date of evaluation: 3/20/25      CHIEF COMPLAINT       Chief Complaint   Patient presents with    Blurred Vision    Arm Pain         HISTORY OF PRESENT ILLNESS    Katie Vincent is a 25 y.o. female who presents complaining of blurred vision in the left eye since she woke up at about 8:00 this morning.  The history is provided by the patient.   Eye Problem  Location:  Left eye  Quality: Blurred vision left eye.  She woke up at 8 AM this morning with bilateral blurry vision she wiped her eyes but she reports that she is still having some blurred vision in her left eye.  Severity:  Mild  Onset quality:  Sudden  Duration:  8 hours  Timing:  Constant  Progression:  Unchanged  Chronicity:  New  Context: not burn, not chemical exposure, not contact lens problem, not direct trauma, not foreign body, not scratch, not smoke exposure and not UV exposure    Relieved by:  Nothing  Worsened by:  Nothing  Ineffective treatments:  None tried  Associated symptoms: blurred vision    Associated symptoms: no discharge, no double vision, no facial rash, no headaches, no inflammation, no itching, no nausea, no numbness, no photophobia, no redness, no scotomas, no swelling, no tearing, no tingling, no vomiting and no weakness        REVIEW OF SYSTEMS       Review of Systems   Eyes:  Positive for blurred vision and visual disturbance. Negative for double vision, photophobia, pain, discharge, redness and itching.   Gastrointestinal:  Negative for nausea and vomiting.   Neurological:  Negative for tingling, weakness, numbness and headaches.   All other systems reviewed and are negative.      PAST MEDICAL HISTORY     Past Medical History:   Diagnosis Date    Diabetes mellitus (HCC)     Gallbladder problem     Gestational diabetes     HTN (hypertension)     Hypertension     Migraine headache

## 2025-03-21 LAB
EKG ATRIAL RATE: 81 BPM
EKG P AXIS: 60 DEGREES
EKG P-R INTERVAL: 146 MS
EKG Q-T INTERVAL: 354 MS
EKG QRS DURATION: 74 MS
EKG QTC CALCULATION (BAZETT): 411 MS
EKG R AXIS: 57 DEGREES
EKG T AXIS: 34 DEGREES
EKG VENTRICULAR RATE: 81 BPM

## 2025-03-21 NOTE — DISCHARGE INSTRUCTIONS
Use Tylenol or Motrin for pain if needed  Make sure you follow-up with ophthalmology tomorrow  If your symptoms worsen increased pain fever chills any concerns return to the emergency department

## 2025-05-28 PROBLEM — R07.89 CHEST WALL PAIN: Status: ACTIVE | Noted: 2022-08-24

## 2025-05-28 PROBLEM — M54.9 UPPER BACK PAIN: Status: ACTIVE | Noted: 2022-08-24

## 2025-05-28 PROBLEM — G43.909 MIGRAINE: Status: ACTIVE | Noted: 2024-07-29

## 2025-05-28 PROBLEM — O14.10 PRE-ECLAMPSIA, SEVERE: Status: ACTIVE | Noted: 2020-07-15

## 2025-05-28 PROBLEM — R51.9 HA (HEADACHE): Status: ACTIVE | Noted: 2025-05-28

## 2025-05-28 PROBLEM — M54.2 NECK PAIN: Status: ACTIVE | Noted: 2022-08-24

## 2025-05-28 PROBLEM — O24.419 GESTATIONAL DIABETES MELLITUS (GDM): Status: ACTIVE | Noted: 2020-05-19

## 2025-05-28 PROBLEM — R42 DIZZINESS: Status: ACTIVE | Noted: 2025-05-28

## 2025-05-28 PROBLEM — M79.602 LEFT ARM PAIN: Status: ACTIVE | Noted: 2022-08-24

## 2025-05-28 PROBLEM — I10 HYPERTENSION: Status: ACTIVE | Noted: 2025-05-28

## 2025-05-29 RX ORDER — CYCLOBENZAPRINE HCL 5 MG
5 TABLET ORAL 3 TIMES DAILY PRN
Qty: 60 TABLET | Refills: 1 | Status: SHIPPED | OUTPATIENT
Start: 2025-05-29

## 2025-05-29 RX ORDER — METOPROLOL SUCCINATE 25 MG/1
25 TABLET, EXTENDED RELEASE ORAL DAILY
Qty: 30 TABLET | Refills: 5 | Status: SHIPPED | OUTPATIENT
Start: 2025-05-29

## 2025-05-30 ENCOUNTER — HOSPITAL ENCOUNTER (OUTPATIENT)
Age: 25
Setting detail: SPECIMEN
Discharge: HOME | End: 2025-05-30

## 2025-05-30 ENCOUNTER — OFFICE VISIT (OUTPATIENT)
Age: 25
End: 2025-05-30

## 2025-05-30 VITALS
WEIGHT: 170.4 LBS | HEIGHT: 60 IN | BODY MASS INDEX: 33.45 KG/M2 | TEMPERATURE: 98.4 F | DIASTOLIC BLOOD PRESSURE: 73 MMHG | RESPIRATION RATE: 16 BRPM | HEART RATE: 89 BPM | SYSTOLIC BLOOD PRESSURE: 136 MMHG

## 2025-05-30 DIAGNOSIS — Z78.9 NONSMOKER: ICD-10-CM

## 2025-05-30 DIAGNOSIS — E55.9 VITAMIN D DEFICIENCY: ICD-10-CM

## 2025-05-30 DIAGNOSIS — I10 PRIMARY HYPERTENSION: Primary | ICD-10-CM

## 2025-05-30 DIAGNOSIS — I10 PRIMARY HYPERTENSION: ICD-10-CM

## 2025-05-30 LAB — 25(OH)D3 SERPL-MCNC: 21.4 NG/ML (ref 30–100)

## 2025-05-30 SDOH — ECONOMIC STABILITY: FOOD INSECURITY: WITHIN THE PAST 12 MONTHS, THE FOOD YOU BOUGHT JUST DIDN'T LAST AND YOU DIDN'T HAVE MONEY TO GET MORE.: NEVER TRUE

## 2025-05-30 SDOH — ECONOMIC STABILITY: FOOD INSECURITY: WITHIN THE PAST 12 MONTHS, YOU WORRIED THAT YOUR FOOD WOULD RUN OUT BEFORE YOU GOT MONEY TO BUY MORE.: NEVER TRUE

## 2025-05-30 ASSESSMENT — PATIENT HEALTH QUESTIONNAIRE - PHQ9
8. MOVING OR SPEAKING SO SLOWLY THAT OTHER PEOPLE COULD HAVE NOTICED. OR THE OPPOSITE, BEING SO FIGETY OR RESTLESS THAT YOU HAVE BEEN MOVING AROUND A LOT MORE THAN USUAL: NOT AT ALL
SUM OF ALL RESPONSES TO PHQ QUESTIONS 1-9: 9
7. TROUBLE CONCENTRATING ON THINGS, SUCH AS READING THE NEWSPAPER OR WATCHING TELEVISION: NOT AT ALL
5. POOR APPETITE OR OVEREATING: NEARLY EVERY DAY
1. LITTLE INTEREST OR PLEASURE IN DOING THINGS: SEVERAL DAYS
SUM OF ALL RESPONSES TO PHQ QUESTIONS 1-9: 9
SUM OF ALL RESPONSES TO PHQ QUESTIONS 1-9: 9
6. FEELING BAD ABOUT YOURSELF - OR THAT YOU ARE A FAILURE OR HAVE LET YOURSELF OR YOUR FAMILY DOWN: NOT AT ALL
3. TROUBLE FALLING OR STAYING ASLEEP: SEVERAL DAYS
SUM OF ALL RESPONSES TO PHQ QUESTIONS 1-9: 9
10. IF YOU CHECKED OFF ANY PROBLEMS, HOW DIFFICULT HAVE THESE PROBLEMS MADE IT FOR YOU TO DO YOUR WORK, TAKE CARE OF THINGS AT HOME, OR GET ALONG WITH OTHER PEOPLE: SOMEWHAT DIFFICULT
9. THOUGHTS THAT YOU WOULD BE BETTER OFF DEAD, OR OF HURTING YOURSELF: NOT AT ALL
2. FEELING DOWN, DEPRESSED OR HOPELESS: SEVERAL DAYS
4. FEELING TIRED OR HAVING LITTLE ENERGY: NEARLY EVERY DAY

## 2025-05-30 ASSESSMENT — ENCOUNTER SYMPTOMS
DIARRHEA: 0
RHINORRHEA: 0
SHORTNESS OF BREATH: 0
WHEEZING: 0
CONSTIPATION: 0
PHOTOPHOBIA: 0
COUGH: 0
SORE THROAT: 0
EYE PAIN: 0
ANAL BLEEDING: 0

## 2025-05-30 NOTE — PATIENT INSTRUCTIONS
Thank you for following up with us at [facility] office. It was a pleasure to meet you!    Our plan is the following:  We will start with the metoprolol to help control blood pressure; this was prescribed primarily for headache, but it can affect heart rate and sometimes blood pressure.  As you have not been taking it, I would like to wait and see how your body responds before starting a new medication.  During the next 2-3 weeks, you should try to check your blood pressure multiple times a day and record them so that we can use the pressure to help determine next steps.  I did order a urine test to determine if there is too much protein getting into the urine.  Since you did have vitamin D deficiency in 2021, I did order a vitamin D level to check.  Your medication list is included in the after visit summary. If you find any differences when compared to your medications at home, please contact the office ([phone]) to let us know.  You can also call the office if you have any additional questions or concerns and speak to one of the staff. They will triage and forward the message to the provider.   We will plan to follow-up in 3 weeks to review blood pressure and fatigue.  Have a great weekend!

## 2025-05-30 NOTE — PROGRESS NOTES
[facility]  [address] [place] [address]  Phone: [phone]  Fax: [phone]      Date of Visit: 2025   Patient Name: Katie Vincent   Patient :  2000     SUBJECTIVE   HPI: Katie Vincent is a 25 y.o. female with hypertension and migraines presenting today with   Chief Complaint   Patient presents with    Fatigue    Hypertension     135-140/90-99 --sb/gdo     She was started on metoprolol by Dr. Rodriguez at least 6 months ago to help control her headaches. She reports that she believes it was prescribed to help control headache by controlling her BP. She has not been taking the metoprolol lately, and so her blood pressure has been high lately. The highest she has seen is 145/99, and the diastolic is usually 94-95. She does still have fatigue for the past 4-5 months as well, but this is not acutely worsened. She does feel she is struggling to stay awake, and she feels tired when she wakes up. Her  says she snores, but he has never said she stops breathing in her sleep. She wakes up frequently with headaches, but they do happen throughout the day.    REVIEW OF SYSTEMS    As noted above in HPI and as follows:    Review of Systems   Constitutional:  Positive for fatigue. Negative for chills and fever.   HENT:  Negative for congestion, rhinorrhea and sore throat.    Eyes:  Positive for visual disturbance. Negative for photophobia and pain.   Respiratory:  Negative for cough, shortness of breath and wheezing.    Cardiovascular:  Negative for chest pain, palpitations and leg swelling.   Gastrointestinal:  Negative for anal bleeding, constipation and diarrhea.   Neurological:  Positive for headaches. Negative for dizziness, seizures and numbness.   Psychiatric/Behavioral:  Negative for behavioral problems and confusion. The patient is not nervous/anxious.       PHYSICAL EXAM   /73   Pulse 89   Temp 98.4 °F

## 2025-05-31 LAB
CREAT UR-MCNC: 151 MG/DL (ref 28–217)
MICROALBUMIN UR-MCNC: <12 MG/L (ref 0–20)
MICROALBUMIN/CREAT UR-RTO: NORMAL MCG/MG CREAT (ref 0–25)

## 2025-06-06 ENCOUNTER — TELEPHONE (OUTPATIENT)
Age: 25
End: 2025-06-06

## 2025-06-06 DIAGNOSIS — G43.109 MIGRAINE WITH AURA AND WITHOUT STATUS MIGRAINOSUS, NOT INTRACTABLE: Primary | ICD-10-CM

## 2025-06-06 NOTE — TELEPHONE ENCOUNTER
Received a response from patient's insurance company that they do not cover this medication.  You may read the reason why if you look under patient's referral tab for prior authorization or under the patient's media tab.

## 2025-06-09 ENCOUNTER — RESULTS FOLLOW-UP (OUTPATIENT)
Age: 25
End: 2025-06-09

## 2025-06-10 NOTE — TELEPHONE ENCOUNTER
Its not a lab that they're not covering its her Rimegepant Sulfate medication that they won't cover.

## 2025-06-12 ENCOUNTER — OFFICE VISIT (OUTPATIENT)
Age: 25
End: 2025-06-12

## 2025-06-12 VITALS
BODY MASS INDEX: 33.36 KG/M2 | WEIGHT: 170.8 LBS | TEMPERATURE: 98.3 F | DIASTOLIC BLOOD PRESSURE: 78 MMHG | HEART RATE: 78 BPM | RESPIRATION RATE: 12 BRPM | SYSTOLIC BLOOD PRESSURE: 118 MMHG

## 2025-06-12 DIAGNOSIS — R53.83 FATIGUE, UNSPECIFIED TYPE: ICD-10-CM

## 2025-06-12 DIAGNOSIS — R51.9 SINUS HEADACHE: ICD-10-CM

## 2025-06-12 DIAGNOSIS — I10 PRIMARY HYPERTENSION: ICD-10-CM

## 2025-06-12 DIAGNOSIS — G44.229 CHRONIC TENSION-TYPE HEADACHE, NOT INTRACTABLE: ICD-10-CM

## 2025-06-12 DIAGNOSIS — R63.5 WEIGHT GAIN: ICD-10-CM

## 2025-06-12 DIAGNOSIS — G44.209 MUSCLE TENSION HEADACHE: Primary | ICD-10-CM

## 2025-06-12 DIAGNOSIS — G43.009 MIGRAINE WITHOUT AURA AND WITHOUT STATUS MIGRAINOSUS, NOT INTRACTABLE: ICD-10-CM

## 2025-06-12 DIAGNOSIS — J30.89 NON-SEASONAL ALLERGIC RHINITIS, UNSPECIFIED TRIGGER: ICD-10-CM

## 2025-06-12 RX ORDER — METOPROLOL SUCCINATE 25 MG/1
25 TABLET, EXTENDED RELEASE ORAL DAILY
Qty: 30 TABLET | Refills: 5 | Status: SHIPPED | OUTPATIENT
Start: 2025-06-12

## 2025-06-12 RX ORDER — CROMOLYN SODIUM 5.2 MG
1 AEROSOL, SPRAY WITH PUMP (ML) NASAL 3 TIMES DAILY
Qty: 26 ML | Refills: 3 | Status: SHIPPED | OUTPATIENT
Start: 2025-06-12

## 2025-06-12 RX ORDER — LORATADINE 10 MG/1
10 TABLET ORAL DAILY
COMMUNITY
Start: 2025-06-12

## 2025-06-12 NOTE — PROGRESS NOTES
patient were advised that Artificial Intelligence will be utilized during this visit to record, process the conversation to generate a clinical note and to support improvement of the AI technology. The patient (or guardian, if applicable) and other individuals in attendance at the appointment consented to the use of AI, including the recording.           REVIEW OF SYSTEM      Review of Systems    Denies any fever chills or night sweats.  Denies any changes in bowel or bladder.  Denies any mucus or blood in stool.    REVIEWED INFORMATION      Current Outpatient Medications   Medication Sig Dispense Refill    VITAMIN D PO Take 125 mcg by mouth daily 5,000 IU      Cyanocobalamin (VITAMIN B-12 ER PO) Take 5,000 mcg by mouth daily      metoprolol succinate (TOPROL XL) 25 MG extended release tablet Take 1 tablet by mouth daily 30 tablet 5    cromolyn (NASALCROM) 5.2 MG/ACT nasal spray 1 spray by Nasal route 3 times daily 26 mL 3    loratadine (CLARITIN) 10 MG tablet Take 1 tablet by mouth daily      cyclobenzaprine (FLEXERIL) 5 MG tablet Take 1 tablet by mouth 3 times daily as needed for Muscle spasms 60 tablet 1    aspirin-acetaminophen-caffeine (EXCEDRIN MIGRAINE) 250-250-65 MG per tablet Take 2 tablets by mouth every 6 hours as needed for Headaches      rimegepant sulfate 75 MG TBDP Take 75 mg by mouth every other day (Patient not taking: Reported on 6/12/2025) 15 tablet 3     No current facility-administered medications for this visit.        Allergies   Allergen Reactions    Rizatriptan Swelling       Past Medical History:   Diagnosis Date    Diabetes mellitus (HCC)     Gallbladder problem     Gestational diabetes     History of chickenpox     HTN (hypertension)     Hypertension     Migraine headache     Preeclampsia        Past Surgical History:   Procedure Laterality Date    CHOLECYSTECTOMY  09/11/2020    GALLBLADDER SURGERY  09/2020    TYMPANOSTOMY TUBE PLACEMENT          Social History     Socioeconomic History

## 2025-06-12 NOTE — PATIENT INSTRUCTIONS
Focus on the low hanging fruit.  2 Dews equals about or more than 300 calories which is 3 pounds per month.    Nasal Saline or get the Nasalcorm.    Keep a headache diary.   Pounding, neck spasm and stuffy and right side of head

## 2025-06-13 RX ORDER — TOPIRAMATE 50 MG/1
50 TABLET, FILM COATED ORAL NIGHTLY
Qty: 90 TABLET | Refills: 1 | Status: SHIPPED | OUTPATIENT
Start: 2025-06-13

## 2025-06-13 NOTE — TELEPHONE ENCOUNTER
Received a response from patient's insurance company and they denied her appeal. Copy on your desk.  Left a message on patient voicemail that it was denied and we would let her know what her doctor wants to do next.

## 2025-06-27 ENCOUNTER — HOSPITAL ENCOUNTER (OUTPATIENT)
Age: 25
Discharge: HOME | End: 2025-06-27
Payer: COMMERCIAL

## 2025-06-27 DIAGNOSIS — I10 PRIMARY HYPERTENSION: ICD-10-CM

## 2025-06-27 DIAGNOSIS — R63.5 WEIGHT GAIN: ICD-10-CM

## 2025-06-27 LAB
T4 SERPL-MCNC: 5.2 UG/DL (ref 4.5–11.7)
TSH SERPL DL<=0.05 MIU/L-ACNC: 2.09 UIU/ML (ref 0.27–4.2)

## 2025-06-27 PROCEDURE — 86376 MICROSOMAL ANTIBODY EACH: CPT

## 2025-06-27 PROCEDURE — 36415 COLL VENOUS BLD VENIPUNCTURE: CPT

## 2025-06-27 PROCEDURE — 84443 ASSAY THYROID STIM HORMONE: CPT

## 2025-06-27 PROCEDURE — 84436 ASSAY OF TOTAL THYROXINE: CPT

## 2025-06-27 PROCEDURE — 86800 THYROGLOBULIN ANTIBODY: CPT

## 2025-06-30 ENCOUNTER — RESULTS FOLLOW-UP (OUTPATIENT)
Age: 25
End: 2025-06-30

## 2025-06-30 DIAGNOSIS — I10 PRIMARY HYPERTENSION: Primary | ICD-10-CM

## 2025-06-30 DIAGNOSIS — Z86.32 HISTORY OF GESTATIONAL DIABETES: ICD-10-CM

## 2025-06-30 DIAGNOSIS — R42 DIZZINESS: ICD-10-CM

## 2025-06-30 LAB
THYROGLOBULIN AB: <12 IU/ML (ref 0–40)
THYROPEROXIDASE AB SERPL IA-ACNC: <4 IU/ML (ref 0–25)

## 2025-07-02 ENCOUNTER — RESULTS FOLLOW-UP (OUTPATIENT)
Age: 25
End: 2025-07-02

## 2025-07-02 ENCOUNTER — HOSPITAL ENCOUNTER (OUTPATIENT)
Age: 25
Discharge: HOME | End: 2025-07-02
Payer: COMMERCIAL

## 2025-07-02 DIAGNOSIS — R42 DIZZINESS: ICD-10-CM

## 2025-07-02 DIAGNOSIS — Z86.32 HISTORY OF GESTATIONAL DIABETES: ICD-10-CM

## 2025-07-02 DIAGNOSIS — I10 PRIMARY HYPERTENSION: ICD-10-CM

## 2025-07-02 LAB
ALBUMIN SERPL-MCNC: 4.2 G/DL (ref 3.5–5.2)
ALP SERPL-CCNC: 98 U/L (ref 35–104)
ALT SERPL-CCNC: 13 U/L (ref 10–35)
ANION GAP SERPL CALCULATED.3IONS-SCNC: 9 MMOL/L (ref 9–16)
AST SERPL-CCNC: 15 U/L (ref 10–35)
BILIRUB SERPL-MCNC: 0.3 MG/DL (ref 0–1.2)
BUN SERPL-MCNC: 11 MG/DL (ref 6–20)
CALCIUM SERPL-MCNC: 8.8 MG/DL (ref 8.6–10.4)
CHLORIDE SERPL-SCNC: 108 MMOL/L (ref 98–107)
CHOLEST SERPL-MCNC: 143 MG/DL (ref 0–199)
CHOLESTEROL/HDL RATIO: 4
CO2 SERPL-SCNC: 23 MMOL/L (ref 20–31)
CREAT SERPL-MCNC: 0.8 MG/DL (ref 0.7–1.2)
EST. AVERAGE GLUCOSE BLD GHB EST-MCNC: 94 MG/DL
GFR, ESTIMATED: >90 ML/MIN/1.73M2
GLUCOSE SERPL-MCNC: 104 MG/DL (ref 74–99)
HBA1C MFR BLD: 4.9 % (ref 4–6)
HDLC SERPL-MCNC: 36 MG/DL
LDLC SERPL CALC-MCNC: 91 MG/DL (ref 0–100)
POTASSIUM SERPL-SCNC: 4.1 MMOL/L (ref 3.7–5.3)
PROT SERPL-MCNC: 7 G/DL (ref 6.6–8.7)
SODIUM SERPL-SCNC: 140 MMOL/L (ref 136–145)
TRIGL SERPL-MCNC: 78 MG/DL (ref 0–149)

## 2025-07-02 PROCEDURE — 80061 LIPID PANEL: CPT

## 2025-07-02 PROCEDURE — 36415 COLL VENOUS BLD VENIPUNCTURE: CPT

## 2025-07-02 PROCEDURE — 83036 HEMOGLOBIN GLYCOSYLATED A1C: CPT

## 2025-07-02 PROCEDURE — 80053 COMPREHEN METABOLIC PANEL: CPT

## 2025-07-11 RX ORDER — METOPROLOL SUCCINATE 25 MG/1
25 TABLET, EXTENDED RELEASE ORAL DAILY
Qty: 30 TABLET | Refills: 5 | Status: SHIPPED | OUTPATIENT
Start: 2025-07-11